# Patient Record
Sex: FEMALE | Race: WHITE | NOT HISPANIC OR LATINO | Employment: STUDENT | URBAN - METROPOLITAN AREA
[De-identification: names, ages, dates, MRNs, and addresses within clinical notes are randomized per-mention and may not be internally consistent; named-entity substitution may affect disease eponyms.]

---

## 2022-09-29 ENCOUNTER — TRANSCRIBE ORDERS (OUTPATIENT)
Dept: URGENT CARE | Facility: CLINIC | Age: 27
End: 2022-09-29

## 2022-09-29 DIAGNOSIS — Z02.1 PHYSICAL EXAM, PRE-EMPLOYMENT: Primary | ICD-10-CM

## 2022-10-03 ENCOUNTER — APPOINTMENT (OUTPATIENT)
Dept: URGENT CARE | Facility: CLINIC | Age: 27
End: 2022-10-03

## 2022-10-03 ENCOUNTER — APPOINTMENT (OUTPATIENT)
Dept: LAB | Facility: CLINIC | Age: 27
End: 2022-10-03

## 2022-10-03 DIAGNOSIS — Z02.1 PHYSICAL EXAM, PRE-EMPLOYMENT: ICD-10-CM

## 2022-10-03 LAB — RUBV IGG SERPL IA-ACNC: 47 IU/ML

## 2022-10-03 PROCEDURE — 86765 RUBEOLA ANTIBODY: CPT

## 2022-10-03 PROCEDURE — 36415 COLL VENOUS BLD VENIPUNCTURE: CPT

## 2022-10-03 PROCEDURE — 86762 RUBELLA ANTIBODY: CPT

## 2022-10-03 PROCEDURE — 86480 TB TEST CELL IMMUN MEASURE: CPT

## 2022-10-03 PROCEDURE — 86787 VARICELLA-ZOSTER ANTIBODY: CPT

## 2022-10-03 PROCEDURE — 86735 MUMPS ANTIBODY: CPT

## 2022-10-04 LAB
MEV IGG SER QL IA: NORMAL
MUV IGG SER QL IA: NORMAL
VZV IGG SER QL IA: NORMAL

## 2022-10-05 LAB
GAMMA INTERFERON BACKGROUND BLD IA-ACNC: 0.04 IU/ML
M TB IFN-G BLD-IMP: NEGATIVE
M TB IFN-G CD4+ BCKGRND COR BLD-ACNC: -0.01 IU/ML
M TB IFN-G CD4+ BCKGRND COR BLD-ACNC: 0 IU/ML
MITOGEN IGNF BCKGRD COR BLD-ACNC: >10 IU/ML

## 2023-01-18 ENCOUNTER — OFFICE VISIT (OUTPATIENT)
Dept: INTERNAL MEDICINE CLINIC | Facility: CLINIC | Age: 28
End: 2023-01-18

## 2023-01-18 VITALS
DIASTOLIC BLOOD PRESSURE: 64 MMHG | HEART RATE: 91 BPM | SYSTOLIC BLOOD PRESSURE: 110 MMHG | HEIGHT: 66 IN | BODY MASS INDEX: 23.88 KG/M2 | TEMPERATURE: 96.9 F | WEIGHT: 148.6 LBS | OXYGEN SATURATION: 98 %

## 2023-01-18 DIAGNOSIS — E80.6 HYPERBILIRUBINEMIA: ICD-10-CM

## 2023-01-18 DIAGNOSIS — R87.619 ABNORMAL CERVICAL PAPANICOLAOU SMEAR, UNSPECIFIED ABNORMAL PAP FINDING: ICD-10-CM

## 2023-01-18 DIAGNOSIS — L40.9 PSORIASIS: Primary | ICD-10-CM

## 2023-01-18 DIAGNOSIS — E78.5 HYPERLIPIDEMIA, UNSPECIFIED HYPERLIPIDEMIA TYPE: ICD-10-CM

## 2023-01-18 DIAGNOSIS — Z13.0 SCREENING FOR DEFICIENCY ANEMIA: ICD-10-CM

## 2023-01-18 DIAGNOSIS — K62.5 RECTAL BLEEDING: ICD-10-CM

## 2023-01-18 DIAGNOSIS — M35.9 UNDIFFERENTIATED CONNECTIVE TISSUE DISEASE (HCC): ICD-10-CM

## 2023-01-18 RX ORDER — MOMETASONE FUROATE 1 MG/G
CREAM TOPICAL DAILY
Qty: 15 G | Refills: 0 | Status: SHIPPED | OUTPATIENT
Start: 2023-01-18

## 2023-01-19 NOTE — PROGRESS NOTES
Name: Deirdre Del Castillo      : 1995      MRN: [id number]  Encounter Provider: Dima Valadez MD  Encounter Date: 2023   Encounter department: [facility]     1  Psoriasis  Assessment & Plan:  Rices type rash of the left hand recommend topical steroid application on a daily basis till resolved    Orders:  -     mometasone (ELOCON) 0 1 % cream; Apply topically daily    2  Rectal bleeding  Assessment & Plan:  Episode of rectal bleeding on 1 day bowel movements with some blood noted in the toilet bowl  No's further episodes  No prior history of rectal bleeding  Recommend FIT testing to detect any ongoing evidence of blood in stool  Orders:  -     Occult Blood, Fecal Immunochemical; Future    3  Screening for deficiency anemia  -     CBC and differential; Future    4  Hyperbilirubinemia  Assessment & Plan:  He is elevation of bilirubin asymptomatic and no sign of tenderness over the liver or gallbladder recommend CMP for follow-up    Orders:  -     Comprehensive metabolic panel; Future    5  Hyperlipidemia, unspecified hyperlipidemia type  Assessment & Plan:  Review of previous blood work indicates mild elevation in LDL  I would like to repeat this study to see if this is a persistent finding we will discuss results with patient pending result completion    Orders:  -     Lipid panel; Future    6  Undifferentiated connective tissue disease ([facility] [address] )  Assessment & Plan:  Previous evaluation of connective tissue disease through [facility] rheumatology Dr Grace December  Patient would like to switch to a [facility] rheumatologist and a referral has been provided  Orders:  -     Ambulatory Referral to Rheumatology; Future    7  Abnormal cervical Papanicolaou smear, unspecified abnormal pap finding  -     Ambulatory Referral to Obstetrics / Gynecology;  Future      Depression Screening and Follow-up Plan: Patient was screened for depression during today's encounter  They screened negative with a PHQ-2 score of 0  Subjective      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 [place]  During today's visit with the patient I reviewed her medical history as well as ongoing medical conditions  The patient has a history of autoimmune disorder  This is been described as a undifferentiated connective tissue disease by her rheumatologist Dr Sarath Del Rio at the [facility]  As she is now employed by [employer] she would like to transfer her rheumatology care to a [facility] rheumatologist and we will be happy to arrange this for the patient  Current symptoms that may be attributed to this condition are a small dry rash on the left hand as well as some occasional left hip irritation  Patient has experienced 3 recent nosebleeds is unusual for her  She also had an episode where there was bowel movements that did show some blood in the water  She has no prior history of any kind of GI bleeding  She has not seen any further evidence of blood since this 1 episode  Patient is noted on previous blood work to have an elevated bilirubin and we have recommended follow-up testing recheck the bilirubin value  The patient is a physical therapist employed by [employer] she indicates that she has no tobacco use on a regular basis and only social use of alcohol  She typically exercises 3 times a week  Family history is significant for lung cancer thyroid conditions hyperlipidemia and hypertension  Review of Systems   Gastrointestinal: Positive for anal bleeding  Musculoskeletal: Positive for arthralgias  Skin: Positive for rash  All other systems reviewed and are negative  No current outpatient medications on file prior to visit         Objective     /64   Pulse 91   Temp (!) 96 9 °F (36 1 °C)   Ht 5' 6" (1 676 m)   Wt 67 4 kg (148 lb 9 6 oz)   SpO2 98%   BMI 23 98 kg/m²     Physical Exam  Vitals reviewed  Constitutional:       General: She is not in acute distress  Appearance: Normal appearance  She is well-developed  She is not ill-appearing  HENT:      Head: Normocephalic  Right Ear: Hearing, tympanic membrane, ear canal and external ear normal       Left Ear: Hearing, tympanic membrane, ear canal and external ear normal       Nose: Nose normal  No mucosal edema  Mouth/Throat:      Mouth: Mucous membranes are moist       Pharynx: Oropharynx is clear  Uvula midline  Eyes:      General: Lids are normal       Extraocular Movements: Extraocular movements intact  Conjunctiva/sclera: Conjunctivae normal       Pupils: Pupils are equal, round, and reactive to light  Neck:      Thyroid: No thyromegaly  Vascular: No carotid bruit or JVD  Cardiovascular:      Rate and Rhythm: Normal rate and regular rhythm  Heart sounds: Normal heart sounds  No murmur heard  Pulmonary:      Effort: Pulmonary effort is normal  No respiratory distress  Breath sounds: Normal breath sounds  No wheezing, rhonchi or rales  Abdominal:      General: Bowel sounds are normal  There is no distension  Palpations: Abdomen is soft  There is no mass  Tenderness: There is no abdominal tenderness  Musculoskeletal:         General: Normal range of motion  Cervical back: Normal range of motion and neck supple  No rigidity or tenderness  Right lower leg: No edema  Left lower leg: No edema  Lymphadenopathy:      Cervical: No cervical adenopathy  Skin:     General: Skin is warm and dry  Coloration: Skin is not jaundiced or pale  Neurological:      General: No focal deficit present  Mental Status: She is alert and oriented to person, place, and time  Deep Tendon Reflexes: Reflexes are normal and symmetric   Reflexes normal    Psychiatric:         Mood and Affect: Mood normal          Speech: Speech normal          Behavior: Behavior normal  Behavior is cooperative  Thought Content:  Thought content normal          Judgment: Judgment normal        Joann Wolf MD

## 2023-01-19 NOTE — ASSESSMENT & PLAN NOTE
Episode of rectal bleeding on 1 day bowel movements with some blood noted in the toilet bowl  No's further episodes  No prior history of rectal bleeding  Recommend FIT testing to detect any ongoing evidence of blood in stool

## 2023-01-19 NOTE — ASSESSMENT & PLAN NOTE
Previous evaluation of connective tissue disease through [facility] rheumatology Dr Diamond Issa  Patient would like to switch to a [facility]'s rheumatologist and a referral has been provided

## 2023-01-19 NOTE — ASSESSMENT & PLAN NOTE
He is elevation of bilirubin asymptomatic and no sign of tenderness over the liver or gallbladder recommend CMP for follow-up

## 2023-01-19 NOTE — ASSESSMENT & PLAN NOTE
Rices type rash of the left hand recommend topical steroid application on a daily basis till resolved

## 2023-01-19 NOTE — ASSESSMENT & PLAN NOTE
Review of previous blood work indicates mild elevation in LDL    I would like to repeat this study to see if this is a persistent finding we will discuss results with patient pending result completion

## 2023-01-21 ENCOUNTER — APPOINTMENT (OUTPATIENT)
Dept: LAB | Age: 28
End: 2023-01-21

## 2023-01-21 DIAGNOSIS — E78.5 HYPERLIPIDEMIA, UNSPECIFIED HYPERLIPIDEMIA TYPE: ICD-10-CM

## 2023-01-21 DIAGNOSIS — E80.6 HYPERBILIRUBINEMIA: ICD-10-CM

## 2023-01-21 DIAGNOSIS — Z13.0 SCREENING FOR DEFICIENCY ANEMIA: ICD-10-CM

## 2023-01-21 LAB
ALBUMIN SERPL BCP-MCNC: 4.1 G/DL (ref 3.5–5)
ALP SERPL-CCNC: 54 U/L (ref 46–116)
ALT SERPL W P-5'-P-CCNC: 15 U/L (ref 12–78)
ANION GAP SERPL CALCULATED.3IONS-SCNC: 3 MMOL/L (ref 4–13)
AST SERPL W P-5'-P-CCNC: 14 U/L (ref 5–45)
BASOPHILS # BLD AUTO: 0.03 THOUSANDS/ÂΜL (ref 0–0.1)
BASOPHILS NFR BLD AUTO: 1 % (ref 0–1)
BILIRUB SERPL-MCNC: 0.99 MG/DL (ref 0.2–1)
BUN SERPL-MCNC: 18 MG/DL (ref 5–25)
CALCIUM SERPL-MCNC: 9.3 MG/DL (ref 8.3–10.1)
CHLORIDE SERPL-SCNC: 105 MMOL/L (ref 96–108)
CHOLEST SERPL-MCNC: 164 MG/DL
CO2 SERPL-SCNC: 28 MMOL/L (ref 21–32)
CREAT SERPL-MCNC: 0.74 MG/DL (ref 0.6–1.3)
EOSINOPHIL # BLD AUTO: 0.03 THOUSAND/ÂΜL (ref 0–0.61)
EOSINOPHIL NFR BLD AUTO: 1 % (ref 0–6)
ERYTHROCYTE [DISTWIDTH] IN BLOOD BY AUTOMATED COUNT: 11.9 % (ref 11.6–15.1)
GFR SERPL CREATININE-BSD FRML MDRD: 110 ML/MIN/1.73SQ M
GLUCOSE P FAST SERPL-MCNC: 82 MG/DL (ref 65–99)
HCT VFR BLD AUTO: 39.1 % (ref 34.8–46.1)
HDLC SERPL-MCNC: 43 MG/DL
HGB BLD-MCNC: 13.1 G/DL (ref 11.5–15.4)
IMM GRANULOCYTES # BLD AUTO: 0.01 THOUSAND/UL (ref 0–0.2)
IMM GRANULOCYTES NFR BLD AUTO: 0 % (ref 0–2)
LDLC SERPL CALC-MCNC: 107 MG/DL (ref 0–100)
LYMPHOCYTES # BLD AUTO: 1.78 THOUSANDS/ÂΜL (ref 0.6–4.47)
LYMPHOCYTES NFR BLD AUTO: 37 % (ref 14–44)
MCH RBC QN AUTO: 29.6 PG (ref 26.8–34.3)
MCHC RBC AUTO-ENTMCNC: 33.5 G/DL (ref 31.4–37.4)
MCV RBC AUTO: 89 FL (ref 82–98)
MONOCYTES # BLD AUTO: 0.41 THOUSAND/ÂΜL (ref 0.17–1.22)
MONOCYTES NFR BLD AUTO: 9 % (ref 4–12)
NEUTROPHILS # BLD AUTO: 2.57 THOUSANDS/ÂΜL (ref 1.85–7.62)
NEUTS SEG NFR BLD AUTO: 52 % (ref 43–75)
NONHDLC SERPL-MCNC: 121 MG/DL
NRBC BLD AUTO-RTO: 0 /100 WBCS
PLATELET # BLD AUTO: 210 THOUSANDS/UL (ref 149–390)
PMV BLD AUTO: 10.4 FL (ref 8.9–12.7)
POTASSIUM SERPL-SCNC: 4.1 MMOL/L (ref 3.5–5.3)
PROT SERPL-MCNC: 8.3 G/DL (ref 6.4–8.4)
RBC # BLD AUTO: 4.42 MILLION/UL (ref 3.81–5.12)
SODIUM SERPL-SCNC: 136 MMOL/L (ref 135–147)
TRIGL SERPL-MCNC: 70 MG/DL
WBC # BLD AUTO: 4.83 THOUSAND/UL (ref 4.31–10.16)

## 2023-01-26 ENCOUNTER — APPOINTMENT (OUTPATIENT)
Dept: LAB | Age: 28
End: 2023-01-26

## 2023-01-26 DIAGNOSIS — K62.5 RECTAL BLEEDING: ICD-10-CM

## 2023-01-26 LAB — HEMOCCULT STL QL IA: NEGATIVE

## 2023-02-15 ENCOUNTER — OFFICE VISIT (OUTPATIENT)
Dept: INTERNAL MEDICINE CLINIC | Facility: CLINIC | Age: 28
End: 2023-02-15

## 2023-02-15 VITALS
BODY MASS INDEX: 24.01 KG/M2 | TEMPERATURE: 97.7 F | SYSTOLIC BLOOD PRESSURE: 110 MMHG | DIASTOLIC BLOOD PRESSURE: 64 MMHG | OXYGEN SATURATION: 98 % | WEIGHT: 149.4 LBS | HEART RATE: 89 BPM | HEIGHT: 66 IN

## 2023-02-15 DIAGNOSIS — L40.9 PSORIASIS: ICD-10-CM

## 2023-02-15 DIAGNOSIS — E78.5 HYPERLIPIDEMIA, UNSPECIFIED HYPERLIPIDEMIA TYPE: ICD-10-CM

## 2023-02-15 DIAGNOSIS — K62.5 RECTAL BLEEDING: ICD-10-CM

## 2023-02-15 DIAGNOSIS — E80.6 HYPERBILIRUBINEMIA: ICD-10-CM

## 2023-02-15 DIAGNOSIS — L03.031 CELLULITIS OF TOE OF RIGHT FOOT: Primary | ICD-10-CM

## 2023-02-15 RX ORDER — CIPROFLOXACIN 250 MG/1
250 TABLET, FILM COATED ORAL EVERY 12 HOURS SCHEDULED
Qty: 14 TABLET | Refills: 0 | Status: SHIPPED | OUTPATIENT
Start: 2023-02-15 | End: 2023-02-22

## 2023-02-15 NOTE — ASSESSMENT & PLAN NOTE
Psoriasis of the skin on the hands seems to be gradually improving recommend continued use of steroid cream

## 2023-02-15 NOTE — ASSESSMENT & PLAN NOTE
No further evidence of active bleeding according to the patient since last visit stool test for blood is negative recommend continued surveillance

## 2023-02-15 NOTE — ASSESSMENT & PLAN NOTE
I have reviewed with the patient her lipid profile cholesterol values show a mild elevation in her LDL reading  Patient education material was provided to the patient to adjust diet in an effort to reduce LDL to a reading under 100

## 2023-02-15 NOTE — ASSESSMENT & PLAN NOTE
Cellulitis of first toe right foot recommend  Soak for 10 to 15 minutes daily and Breo 250 mg twice a day for 7 days

## 2023-02-15 NOTE — ASSESSMENT & PLAN NOTE
Previous reading of hyperbilirubinemia was reviewed with follow-up study for this visit  No evidence of bilirubin elevation on today's blood work    Patient is asymptomatic continue with follow-up blood test in 1 year

## 2023-02-15 NOTE — PROGRESS NOTES
Name: Janelle Myles      : 1995      MRN: [id number]  Encounter Provider: Casey Schmidt MD  Encounter Date: 2/15/2023   Encounter department: [facility]     1  Cellulitis of toe of right foot  Assessment & Plan:  Cellulitis of first toe right foot recommend  Soak for 10 to 15 minutes daily and Breo 250 mg twice a day for 7 days    Orders:  -     ciprofloxacin (CIPRO) 250 mg tablet; Take 1 tablet (250 mg total) by mouth every 12 (twelve) hours for 7 days    2  Hyperlipidemia, unspecified hyperlipidemia type  Assessment & Plan:  I have reviewed with the patient her lipid profile cholesterol values show a mild elevation in her LDL reading  Patient education material was provided to the patient to adjust diet in an effort to reduce LDL to a reading under 100       3  Hyperbilirubinemia  Assessment & Plan:  Previous reading of hyperbilirubinemia was reviewed with follow-up study for this visit  No evidence of bilirubin elevation on today's blood work  Patient is asymptomatic continue with follow-up blood test in 1 year      4  Rectal bleeding  Assessment & Plan:  No further evidence of active bleeding according to the patient since last visit stool test for blood is negative recommend continued surveillance      5  Psoriasis  Assessment & Plan:  Psoriasis of the skin on the hands seems to be gradually improving recommend continued use of steroid cream           Subjective      This 26-year-old female patient returns today for a follow-up visit to review her most recent blood work  Her skin rash has started to respond nicely to the steroid application topically  The patient does have concerns about a discolored toenail on the first toe of her right foot  She also had an episode of tenderness and redness around the perimeter of the nail bed  She denies any recent trauma to the toenail    Looks like she most likely has a mild cellulitis of the tissue around the nail there is some discoloration of the nail which may indicate a early fungal infection underneath the toenail  Ramila May is to treat with antibiotics and topical Betadine soak and reevaluate if symptoms do not resolve  May need topical goal for the discoloration of the nail possible fungal infection    Review of Systems   Skin:        Inflamed skin around the perimeter of the toenail first toe of the right foot some discoloration of the toenail itself and a brown color       Current Outpatient Medications on File Prior to Visit   Medication Sig   • mometasone (ELOCON) 0 1 % cream Apply topically daily       Objective     /64   Pulse 89   Temp 97 7 °F (36 5 °C)   Ht 5' 6" (1 676 m)   Wt 67 8 kg (149 lb 6 4 oz)   SpO2 98%   BMI 24 11 kg/m²     Physical Exam  Vitals reviewed  Constitutional:       General: She is not in acute distress  Appearance: Normal appearance  She is well-developed  She is not ill-appearing  HENT:      Right Ear: Hearing and external ear normal       Left Ear: Hearing and external ear normal       Nose: Nose normal  No mucosal edema  Mouth/Throat:      Pharynx: Uvula midline  Eyes:      General: Lids are normal       Conjunctiva/sclera: Conjunctivae normal       Pupils: Pupils are equal, round, and reactive to light  Neck:      Thyroid: No thyromegaly  Vascular: No carotid bruit or JVD  Cardiovascular:      Rate and Rhythm: Normal rate and regular rhythm  Heart sounds: Normal heart sounds  No murmur heard  Pulmonary:      Effort: Pulmonary effort is normal  No respiratory distress  Breath sounds: Normal breath sounds  Abdominal:      General: Bowel sounds are normal       Palpations: Abdomen is soft  Musculoskeletal:         General: Normal range of motion  Lymphadenopathy:      Cervical: No cervical adenopathy  Skin:     General: Skin is warm and dry        Comments: Erythema and mild tenderness of the skin around the toenail of the first toe right foot some discoloration of the toenail suggestive of possible fungal infection   Neurological:      Mental Status: She is alert and oriented to person, place, and time  Deep Tendon Reflexes: Reflexes are normal and symmetric  Reflexes normal    Psychiatric:         Speech: Speech normal          Behavior: Behavior normal  Behavior is cooperative  Thought Content:  Thought content normal          Judgment: Judgment normal        Buzz Luevano MD

## 2023-02-28 PROBLEM — R87.610 ASCUS WITH POSITIVE HIGH RISK HPV CERVICAL: Status: ACTIVE | Noted: 2023-02-28

## 2023-02-28 PROBLEM — R87.810 ASCUS WITH POSITIVE HIGH RISK HPV CERVICAL: Status: ACTIVE | Noted: 2023-02-28

## 2023-02-28 PROBLEM — Z98.890 HISTORY OF COLPOSCOPY WITH CERVICAL BIOPSY: Status: ACTIVE | Noted: 2023-02-28

## 2023-02-28 NOTE — PROGRESS NOTES
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 other  Normal colposcopy '21  VAIN 1 10/19  This most likely represents colonization with high risk HPV other  She has been vaccinated with Gardasil as a teenager  Recommend follow-up Pap smear in 6 weeks at an annual visit  Currently using condoms for contraception  Pregnancy would be acceptable  Recommend prenatal vitamins  Neural tube defects discussed  Semicid recommended with condoms if she would like increased efficacy  Today's visit took 51 minutes which included reviewing records from both [facility] and [facility]  Pap smear and colposcopy results are listed below under PMH  Diagnoses and all orders for this visit:    ASCUS with positive high risk HPV cervical    History of colposcopy with cervical biopsy    Abnormal cervical Papanicolaou smear, unspecified abnormal pap finding  -     Ambulatory Referral to Obstetrics / Gynecology              Subjective:        Patient ID: Cecelia Campbell is a 28 y o  female  Cecelia Campbell presents today for a follow-up visit due to abnormal Pap smears  She is not due for an annual visit until after April 22  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 [employer]  She is in the same relationship since August 2020  Her [facility] reviews the history of Pap smears and colposcopy since 2019  She has never smoked  The following portions of the patient's history were reviewed and updated as appropriate: She  has no past medical history on file    Patient Active Problem List    Diagnosis Date Noted   • ASCUS with positive high risk HPV cervical 02/28/2023   • History of colposcopy with cervical biopsy 02/28/2023   • Cellulitis of toe of right foot 02/15/2023   • Psoriasis 01/18/2023   • Rectal bleeding 01/18/2023   • Hyperbilirubinemia 01/18/2023   • Undifferentiated connective tissue disease ([facility] [address] ) 01/18/2023   • Hyperlipidemia 01/18/2023 PMH:  Menarche 14  Gardasil  [place]  24 , lifetime partners 5  Never smoked  No STD's other than HPV HR  Abn Pap - Colpo - VAIN 1, 2 Vaginal Bx's and 2 Cervical Bx's, latter said HPV changes not diagnostic of cin1  ASCUS, HR HPV + - normal Colpo '21  ASCUS, HR HPV 4/22   G0  Eczema - hands '22  She  has a past surgical history that includes [place] tooth extraction  Her family history includes Asthma in her brother; Diabetes in her paternal grandmother; Hyperlipidemia in her paternal grandfather; Hypertension in her brother; Kidney disease in her paternal grandmother; Thyroid disease in her mother and paternal grandmother  FH:  M - Hypothyroid  PGM - DM II, kidney disease, thyroid disease  PGF -hyperlipidemia  MGM -smoker, d Lung Ca  MGF -smoker, d lung disease  She  reports that she has never smoked  She has never been exposed to tobacco smoke  She has never used smokeless tobacco  She reports current alcohol use  She reports that she does not use drugs  SH:  Works in [employer] at [employer]  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, [employer]  Current Outpatient Medications   Medication Sig Dispense Refill   • mometasone (ELOCON) 0 1 % cream Apply topically daily 15 g 0     No current facility-administered medications for this visit  Current Outpatient Medications on File Prior to Visit   Medication Sig   • mometasone (ELOCON) 0 1 % cream Apply topically daily     No current facility-administered medications on file prior to visit  She is allergic to sulfamethoxazole-trimethoprim, amoxicillin, and sulfa antibiotics       Review of Systems   Constitutional: Negative for activity change, appetite change, fatigue and unexpected weight change  Eyes: Negative for visual disturbance  Respiratory: Negative for cough, chest tightness, shortness of breath and wheezing  Cardiovascular: Negative for chest pain, palpitations and leg swelling  Breast: Patient denies tenderness, nipple discharge, masses, or erythema  Gastrointestinal: Negative for abdominal distention, abdominal pain, blood in stool, constipation, diarrhea, nausea and vomiting  Endocrine: Negative for cold intolerance and heat intolerance  Genitourinary: Negative for decreased urine volume, difficulty urinating, dyspareunia, dysuria, frequency, hematuria, menstrual problem, pelvic pain, urgency, vaginal bleeding, vaginal discharge and vaginal pain  Musculoskeletal: Positive for arthralgias (Left hip)  Skin: Positive for rash (In the skin between the fingers  Diagnosed as eczema recently  )  Neurological: Negative for weakness, light-headedness, numbness and headaches  Hematological: Does not bruise/bleed easily  Psychiatric/Behavioral: Negative for agitation, behavioral problems and sleep disturbance  The patient is not nervous/anxious  Objective:    Vitals:    03/01/23 1012   BP: 110/70   BP Location: Left arm   Patient Position: Sitting   Cuff Size: Standard   Weight: 66 9 kg (147 lb 6 4 oz)   Height: 5' 5" (1 651 m)            Physical Exam  Vitals and nursing note reviewed  Exam conducted with a chaperone present  Constitutional:       Appearance: Normal appearance  She is normal weight  Eyes:      General: No scleral icterus  Right eye: No discharge  Left eye: No discharge  Extraocular Movements: Extraocular movements intact  Pupils: Pupils are equal, round, and reactive to light  Cardiovascular:      Rate and Rhythm: Normal rate and regular rhythm  Heart sounds: Normal heart sounds  No murmur heard  Pulmonary:      Effort: Pulmonary effort is normal  No respiratory distress  Breath sounds: Normal breath sounds  Abdominal:      General: Abdomen is flat  There is no distension  Palpations: Abdomen is soft  Tenderness: There is no abdominal tenderness  There is no right CVA tenderness, left CVA tenderness, guarding or rebound  Hernia: No hernia is present     Genitourinary: General: Normal vulva  Comments: The vagina and cervix are unremarkable  The cervix is nulliparous  There are no lesions nor induration  Musculoskeletal:      Cervical back: Normal range of motion and neck supple  No rigidity or tenderness  Lymphadenopathy:      Cervical: No cervical adenopathy  Skin:     General: Skin is warm and dry  Comments: Scaling and erythema within the webbing between a few fingers   Neurological:      Mental Status: She is alert  Psychiatric:         Mood and Affect: Mood normal          Behavior: Behavior normal          Thought Content:  Thought content normal          Judgment: Judgment normal

## 2023-03-01 ENCOUNTER — OFFICE VISIT (OUTPATIENT)
Dept: OBGYN CLINIC | Facility: CLINIC | Age: 28
End: 2023-03-01

## 2023-03-01 VITALS
DIASTOLIC BLOOD PRESSURE: 70 MMHG | SYSTOLIC BLOOD PRESSURE: 110 MMHG | WEIGHT: 147.4 LBS | BODY MASS INDEX: 24.56 KG/M2 | HEIGHT: 65 IN

## 2023-03-01 DIAGNOSIS — R87.619 ABNORMAL CERVICAL PAPANICOLAOU SMEAR, UNSPECIFIED ABNORMAL PAP FINDING: ICD-10-CM

## 2023-03-01 DIAGNOSIS — Z98.890 HISTORY OF COLPOSCOPY WITH CERVICAL BIOPSY: ICD-10-CM

## 2023-03-01 DIAGNOSIS — R87.610 ASCUS WITH POSITIVE HIGH RISK HPV CERVICAL: Primary | ICD-10-CM

## 2023-03-01 DIAGNOSIS — R87.810 ASCUS WITH POSITIVE HIGH RISK HPV CERVICAL: Primary | ICD-10-CM

## 2023-03-14 DIAGNOSIS — R21 RASH: Primary | ICD-10-CM

## 2023-04-04 DIAGNOSIS — B35.1 NAIL FUNGUS: Primary | ICD-10-CM

## 2023-04-21 PROBLEM — Z01.419 ENCOUNTER FOR ANNUAL ROUTINE GYNECOLOGICAL EXAMINATION: Status: ACTIVE | Noted: 2023-04-21

## 2023-04-21 PROBLEM — Z87.411 HISTORY OF VAGINAL DYSPLASIA: Status: ACTIVE | Noted: 2023-04-21

## 2023-04-29 PROBLEM — Z98.890 HISTORY OF COLPOSCOPY WITH CERVICAL BIOPSY: Status: RESOLVED | Noted: 2023-02-28 | Resolved: 2023-04-29

## 2023-05-09 PROBLEM — Z98.890 HISTORY OF COLPOSCOPY WITH CERVICAL BIOPSY: Status: ACTIVE | Noted: 2023-05-09

## 2023-05-09 NOTE — PROGRESS NOTES
Assessment/Plan:  NGE    BCM-  Condoms - rec PNV's        Pap smear q 3yrs  - now, '26 if normal  Recurrent ASCUS with persistent HR HPV other  Normal colposcopy '21  VAIN 1 10/19  This most likely represents colonization with high risk HPV other  She has been vaccinated with Gardasil as a teenager  Cervical Cultures - declined                                                                         RTO 1 yr  SBA monthly                                                                              Exercise 3/ wk                                                                                        Calcium 1,000 mg/d with Vit D                    Depression Screen: Neg                                     Diagnoses and all orders for this visit:    Encounter for annual routine gynecological examination  -     Liquid-based pap, screening    ASCUS with positive high risk HPV cervical  -     Liquid-based pap, screening    History of colposcopy with cervical biopsy  -     Liquid-based pap, screening    History of vaginal dysplasia  -     Liquid-based pap, screening    Screening for cervical cancer  -     Liquid-based pap, screening    Other orders  -     clobetasol (TEMOVATE) 0 05 % cream; APPLY TWICE DAILY TO FINGER FOR 2 WEEKS THEN AS NEEDED              Subjective:        Patient ID: Octavia Johnson is a 33 y o  female  [facility]  She has a history of abnormal Pap smears and dysplasia  She has had both FRANNY-1 and Vain 1  She is due for a Pap smear today  Her menses are regular  Her last menstrual period was May 6  She is at the tail end of her menses currently  She and her boyfriend broke up in March  She has not been sexually active since  She declined cervical cultures        The following portions of the patient's history were reviewed and updated as appropriate: She  has no past medical history on file  Patient Active Problem List    Diagnosis Date Noted   • History of colposcopy with cervical biopsy 05/09/2023   • Encounter for annual routine gynecological examination 04/21/2023   • History of vaginal dysplasia 04/21/2023   • ASCUS with positive high risk HPV cervical 02/28/2023   • Cellulitis of toe of right foot 02/15/2023   • Psoriasis 01/18/2023   • Rectal bleeding 01/18/2023   • Hyperbilirubinemia 01/18/2023   • Undifferentiated connective tissue disease ([facility] [address] ) 01/18/2023   • Hyperlipidemia 01/18/2023   PMH:  Menarche 11  Gardasil - teenager  [place]  24 , lifetime partners 5  Never smoked  No STD's other than HPV HR  Abn Pap - Colpo - VAIN 1, 2 Vaginal Bx's and 2 Cervical Bx's, latter said HPV changes not diagnostic of CIN1 10/19  ASCUS, HR HPV + - normal Colpo '21  ASCUS, HR HPV 4/22   G0  Eczema - hands '22  She  has a past surgical history that includes [place] tooth extraction  Her family history includes Asthma in her brother; Diabetes in her paternal grandmother; Hyperlipidemia in her paternal grandfather; Hypertension in her brother; Kidney disease in her paternal grandmother; Thyroid disease in her mother and paternal grandmother  FH:  M - Hypothyroid  PGM - DM II, kidney disease, thyroid disease  PGF -hyperlipidemia  MGM -smoker, d Lung Ca  MGF -smoker, d lung disease  She  reports that she has never smoked  She has never been exposed to tobacco smoke  She has never used smokeless tobacco  She reports current alcohol use  She reports that she does not use drugs  SH:  Works in [employer]  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, Anise Cushing  Patient ship ended 3/23  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  Current Outpatient Medications   Medication Sig Dispense Refill   • clobetasol (TEMOVATE) 0 05 % cream APPLY TWICE DAILY TO FINGER FOR 2 WEEKS THEN AS NEEDED       No current facility-administered medications for this visit       Current Outpatient Medications on "File Prior to Visit   Medication Sig   • clobetasol (TEMOVATE) 0 05 % cream APPLY TWICE DAILY TO FINGER FOR 2 WEEKS THEN AS NEEDED   • [DISCONTINUED] ciclopirox (PENLAC) 8 % solution Apply daily to infected toenail move once a week with alcohol on cotton ball continue use for 3 months or until fungus infection has resolved   • [DISCONTINUED] mometasone (ELOCON) 0 1 % cream Apply topically daily     No current facility-administered medications on file prior to visit  She is allergic to sulfamethoxazole-trimethoprim, amoxicillin, and sulfa antibiotics       Review of Systems   Constitutional: Negative for activity change, appetite change, fatigue and unexpected weight change  Eyes: Negative for visual disturbance  Respiratory: Negative for cough, chest tightness, shortness of breath and wheezing  Cardiovascular: Negative for chest pain, palpitations and leg swelling  Breast: Patient denies tenderness, nipple discharge, masses, or erythema  Gastrointestinal: Negative for abdominal distention, abdominal pain, blood in stool, constipation, diarrhea, nausea and vomiting  Endocrine: Negative for cold intolerance and heat intolerance  Genitourinary: Negative for decreased urine volume, difficulty urinating, dyspareunia, dysuria, frequency, hematuria, menstrual problem, pelvic pain, urgency, vaginal bleeding, vaginal discharge and vaginal pain  Musculoskeletal: Negative for arthralgias  Skin: Negative for rash  Neurological: Negative for weakness, light-headedness, numbness and headaches  Hematological: Does not bruise/bleed easily  Psychiatric/Behavioral: Negative for agitation, behavioral problems and sleep disturbance  The patient is not nervous/anxious            Objective:    Vitals:    05/10/23 0926   BP: 110/80   BP Location: Left arm   Patient Position: Sitting   Cuff Size: Standard   Weight: 67 9 kg (149 lb 12 8 oz)   Height: 5' 6\" (1 676 m)            Physical Exam  Vitals and " nursing note reviewed  Constitutional:       General: She is not in acute distress  Appearance: Normal appearance  She is well-developed and normal weight  HENT:      Head: Normocephalic and atraumatic  Eyes:      General: No scleral icterus  Right eye: No discharge  Left eye: No discharge  Extraocular Movements: Extraocular movements intact  Conjunctiva/sclera: Conjunctivae normal    Neck:      Thyroid: No thyromegaly  Trachea: No tracheal deviation  Cardiovascular:      Rate and Rhythm: Normal rate and regular rhythm  Heart sounds: Normal heart sounds  No murmur heard  Pulmonary:      Effort: Pulmonary effort is normal  No respiratory distress  Breath sounds: Normal breath sounds  No wheezing  Chest:   Breasts:     Breasts are symmetrical       Right: No inverted nipple, mass, nipple discharge, skin change or tenderness  Left: No inverted nipple, mass, nipple discharge, skin change or tenderness  Abdominal:      General: Bowel sounds are normal  There is no distension  Palpations: Abdomen is soft  There is no mass  Tenderness: There is no abdominal tenderness  There is no guarding or rebound  Genitourinary:     General: Normal vulva  Labia:         Right: No rash, tenderness or lesion  Left: No rash, tenderness or lesion  Vagina: Normal       Cervix: No cervical motion tenderness or discharge  Uterus: Not deviated, not enlarged and not tender  Adnexa:         Right: No mass, tenderness or fullness  Left: No mass, tenderness or fullness  Rectum: No external hemorrhoid  Comments: Urethral meatus within normal limits  Perineum within normal limits  Bladder well supported  Vulva and vagina within normal limits  Musculoskeletal:         General: No tenderness  Normal range of motion  Cervical back: Normal range of motion and neck supple     Lymphadenopathy:      Cervical: No cervical adenopathy  Skin:     General: Skin is warm and dry  Neurological:      Mental Status: She is alert and oriented to person, place, and time  Psychiatric:         Mood and Affect: Mood normal          Behavior: Behavior normal          Thought Content:  Thought content normal          Judgment: Judgment normal

## 2023-05-10 ENCOUNTER — ANNUAL EXAM (OUTPATIENT)
Dept: OBGYN CLINIC | Facility: CLINIC | Age: 28
End: 2023-05-10

## 2023-05-10 VITALS
SYSTOLIC BLOOD PRESSURE: 110 MMHG | DIASTOLIC BLOOD PRESSURE: 80 MMHG | HEIGHT: 66 IN | WEIGHT: 149.8 LBS | BODY MASS INDEX: 24.08 KG/M2

## 2023-05-10 DIAGNOSIS — R87.810 ASCUS WITH POSITIVE HIGH RISK HPV CERVICAL: ICD-10-CM

## 2023-05-10 DIAGNOSIS — Z12.4 SCREENING FOR CERVICAL CANCER: ICD-10-CM

## 2023-05-10 DIAGNOSIS — R87.610 ASCUS WITH POSITIVE HIGH RISK HPV CERVICAL: ICD-10-CM

## 2023-05-10 DIAGNOSIS — Z87.411 HISTORY OF VAGINAL DYSPLASIA: ICD-10-CM

## 2023-05-10 DIAGNOSIS — Z98.890 HISTORY OF COLPOSCOPY WITH CERVICAL BIOPSY: ICD-10-CM

## 2023-05-10 DIAGNOSIS — Z01.419 ENCOUNTER FOR ANNUAL ROUTINE GYNECOLOGICAL EXAMINATION: Primary | ICD-10-CM

## 2023-05-10 RX ORDER — CLOBETASOL PROPIONATE 0.5 MG/G
CREAM TOPICAL
COMMUNITY
Start: 2023-04-11

## 2023-05-18 LAB
LAB AP GYN PRIMARY INTERPRETATION: NORMAL
Lab: NORMAL

## 2023-07-08 PROBLEM — Z98.890 HISTORY OF COLPOSCOPY WITH CERVICAL BIOPSY: Status: RESOLVED | Noted: 2023-05-09 | Resolved: 2023-07-08

## 2023-08-23 DIAGNOSIS — L03.039 CELLULITIS OF TOE, UNSPECIFIED LATERALITY: Primary | ICD-10-CM

## 2023-08-23 RX ORDER — CIPROFLOXACIN 250 MG/1
250 TABLET, FILM COATED ORAL EVERY 12 HOURS SCHEDULED
Qty: 14 TABLET | Refills: 0 | Status: SHIPPED | OUTPATIENT
Start: 2023-08-23 | End: 2023-08-30

## 2023-09-15 ENCOUNTER — APPOINTMENT (OUTPATIENT)
Dept: LAB | Age: 28
End: 2023-09-15
Payer: COMMERCIAL

## 2023-09-15 DIAGNOSIS — Z00.8 HEALTH EXAMINATION IN POPULATION SURVEY: Primary | ICD-10-CM

## 2023-09-15 LAB
CHOLEST SERPL-MCNC: 156 MG/DL
EST. AVERAGE GLUCOSE BLD GHB EST-MCNC: 94 MG/DL
HBA1C MFR BLD: 4.9 %
HDLC SERPL-MCNC: 43 MG/DL
LDLC SERPL CALC-MCNC: 81 MG/DL (ref 0–100)
NONHDLC SERPL-MCNC: 113 MG/DL
TRIGL SERPL-MCNC: 160 MG/DL

## 2023-09-15 PROCEDURE — 83036 HEMOGLOBIN GLYCOSYLATED A1C: CPT

## 2023-09-15 PROCEDURE — 36415 COLL VENOUS BLD VENIPUNCTURE: CPT

## 2023-09-15 PROCEDURE — 80061 LIPID PANEL: CPT

## 2024-02-21 PROBLEM — Z01.419 ENCOUNTER FOR ANNUAL ROUTINE GYNECOLOGICAL EXAMINATION: Status: RESOLVED | Noted: 2023-04-21 | Resolved: 2024-02-21

## 2024-06-27 NOTE — PROGRESS NOTES
Assessment        Diagnoses and all orders for this visit:    Encntr for gyn exam (general) (routine) w/o abn findings    Cervical cancer screening  -     Liquid-based pap, screening    Screen for STD (sexually transmitted disease)  -     Chlamydia/GC amplified DNA by PCR  -     Hepatitis C antibody; Future  -     HIV 1/2 AG/AB w Reflex [facility] for 2 yr old and above; Future             Plan      All questions answered.  Await pap smear results.  Chlamydia specimen.  Contraception: condoms.  Discussed healthy lifestyle modifications.  Educational material distributed.  Follow up in 1 year.  Follow up as needed.  GC specimen.  Pap smear.       Gunnar Nettles is a 29 y.o. female who presents for annual exam.      Chief Complaint   Patient presents with    Gynecologic Exam     H/o abnormal PAPs,  VAIN 1 in 2019, FRANNY 1    She is sexually active, same partner x 1 year    5/10/23 NILM no HPV test    PAP 22  ASCUS HPV other   Colpo 6/3/22 benign    PAP 3/23/21 ASCUS HPV other   Colpo 21 ECC negative    PAP 10/2019 LSIL  Colpo 10/21/2019 FRANNY 1, VAIN 1     pap NILM  2018 pap NILM      Last STD testing 2 years ago    Last Pap: 5/10/23    HPV vaccine completed:yes   Current contraception: condoms  History of abnormal Pap smear: yes - ASCUS HPV  History of abnormal mammogram: no  Family history of uterine or ovarian cancer: no  Family history of breast cancer: no  Family history of colon cancer: no    OB History    Para Term  AB Living   0 0 0 0 0 0   SAB IAB Ectopic Multiple Live Births   0 0 0 0 0   Obstetric Comments   Menarche: 11       Menstrual History:  OB History          0    Para   0    Term   0       0    AB   0    Living   0         SAB   0    IAB   0    Ectopic   0    Multiple   0    Live Births   0           Obstetric Comments   Menarche: 11             Patient's last menstrual period was 2024 (exact date).             No past medical history on  "file.  Past Surgical History:   Procedure Laterality Date    WISDOM TOOTH EXTRACTION       Family History   Problem Relation Age of Onset    Thyroid disease Mother     Hypertension Brother     Asthma Brother     Kidney disease Paternal Grandmother     Thyroid disease Paternal Grandmother     Diabetes Paternal Grandmother     Hyperlipidemia Paternal Grandfather        Social History     Tobacco Use    Smoking status: Never     Passive exposure: Never    Smokeless tobacco: Never   Vaping Use    Vaping status: Never Used   Substance Use Topics    Alcohol use: Yes     Comment: social    Drug use: Never          Current Outpatient Medications:     clobetasol (TEMOVATE) 0.05 % cream, APPLY TWICE DAILY TO FINGER FOR 2 WEEKS THEN AS NEEDED, Disp: , Rfl:     Allergies   Allergen Reactions    Sulfamethoxazole-Trimethoprim Other (See Comments)    Amoxicillin Rash    Sulfa Antibiotics Rash           Review of Systems   Constitutional: Negative.    HENT: Negative.     Eyes: Negative.    Respiratory: Negative.     Cardiovascular: Negative.    Gastrointestinal: Negative.    Endocrine: Negative.    Genitourinary:         As noted in HPI   Musculoskeletal: Negative.    Skin: Negative.    Allergic/Immunologic: Negative.    Neurological: Negative.    Hematological: Negative.    Psychiatric/Behavioral: Negative.         /68   Pulse 66   Ht 5' 7\" (1.702 m)   Wt 68.4 kg (150 lb 12.8 oz)   LMP 06/22/2024 (Exact Date)   SpO2 98%   BMI 23.62 kg/m²         Physical Exam  Constitutional:       Appearance: She is well-developed.   Genitourinary:      Vulva, bladder and rectum normal.      No lesions in the vagina.      Right Labia: No rash, tenderness, lesions, skin changes or Bartholin's cyst.     Left Labia: No tenderness, lesions, skin changes, Bartholin's cyst or rash.     No inguinal adenopathy present in the right or left side.     No vaginal discharge, tenderness or bleeding.      No vaginal prolapse present.     No vaginal " atrophy present.       Right Adnexa: not tender, not full and no mass present.     Left Adnexa: not tender, not full and no mass present.     No cervical motion tenderness, friability, lesion or polyp.      Uterus is not enlarged or tender.      Pelvic exam was performed with patient in the lithotomy position.   Rectum:      No external hemorrhoid.   Breasts:     Right: No mass, nipple discharge, skin change or tenderness.      Left: No mass, nipple discharge, skin change or tenderness.   HENT:      Head: Normocephalic.      Nose: Nose normal.   Eyes:      Conjunctiva/sclera: Conjunctivae normal.   Neck:      Thyroid: No thyromegaly.   Cardiovascular:      Rate and Rhythm: Normal rate.   Pulmonary:      Effort: Pulmonary effort is normal.   Abdominal:      General: There is no distension.      Palpations: Abdomen is soft. There is no mass.      Tenderness: There is no abdominal tenderness. There is no guarding or rebound.   Musculoskeletal:         General: No tenderness.      Cervical back: Neck supple. No muscular tenderness.   Lymphadenopathy:      Cervical: No cervical adenopathy.      Lower Body: No right inguinal adenopathy. No left inguinal adenopathy.   Neurological:      Mental Status: She is alert and oriented to person, place, and time.   Skin:     General: Skin is warm and dry.   Psychiatric:         Mood and Affect: Mood normal.         Behavior: Behavior normal.             Future Appointments   Date Time Provider Department Center   7/17/2024  2:00 PM MD DIPAK Brown PCP [place]

## 2024-06-28 ENCOUNTER — ANNUAL EXAM (OUTPATIENT)
Dept: OBGYN CLINIC | Facility: CLINIC | Age: 29
End: 2024-06-28
Payer: COMMERCIAL

## 2024-06-28 VITALS
HEART RATE: 66 BPM | SYSTOLIC BLOOD PRESSURE: 110 MMHG | BODY MASS INDEX: 23.67 KG/M2 | OXYGEN SATURATION: 98 % | HEIGHT: 67 IN | DIASTOLIC BLOOD PRESSURE: 68 MMHG | WEIGHT: 150.8 LBS

## 2024-06-28 DIAGNOSIS — Z11.3 SCREEN FOR STD (SEXUALLY TRANSMITTED DISEASE): ICD-10-CM

## 2024-06-28 DIAGNOSIS — Z12.4 CERVICAL CANCER SCREENING: ICD-10-CM

## 2024-06-28 DIAGNOSIS — Z01.419 ENCNTR FOR GYN EXAM (GENERAL) (ROUTINE) W/O ABN FINDINGS: Primary | ICD-10-CM

## 2024-06-28 PROCEDURE — G0145 SCR C/V CYTO,THINLAYER,RESCR: HCPCS | Performed by: STUDENT IN AN ORGANIZED HEALTH CARE EDUCATION/TRAINING PROGRAM

## 2024-06-28 PROCEDURE — G0476 HPV COMBO ASSAY CA SCREEN: HCPCS | Performed by: OBSTETRICS & GYNECOLOGY

## 2024-06-28 PROCEDURE — 87591 N.GONORRHOEAE DNA AMP PROB: CPT | Performed by: OBSTETRICS & GYNECOLOGY

## 2024-06-28 PROCEDURE — S0612 ANNUAL GYNECOLOGICAL EXAMINA: HCPCS | Performed by: OBSTETRICS & GYNECOLOGY

## 2024-06-28 PROCEDURE — G0124 SCREEN C/V THIN LAYER BY MD: HCPCS | Performed by: STUDENT IN AN ORGANIZED HEALTH CARE EDUCATION/TRAINING PROGRAM

## 2024-06-28 PROCEDURE — 87491 CHLMYD TRACH DNA AMP PROBE: CPT | Performed by: OBSTETRICS & GYNECOLOGY

## 2024-07-01 LAB
HPV HR 12 DNA CVX QL NAA+PROBE: POSITIVE
HPV16 DNA CVX QL NAA+PROBE: NEGATIVE
HPV18 DNA CVX QL NAA+PROBE: NEGATIVE

## 2024-07-02 LAB
C TRACH DNA SPEC QL NAA+PROBE: NEGATIVE
N GONORRHOEA DNA SPEC QL NAA+PROBE: NEGATIVE

## 2024-07-08 LAB
LAB AP GYN PRIMARY INTERPRETATION: ABNORMAL
Lab: ABNORMAL
PATH INTERP SPEC-IMP: ABNORMAL

## 2024-07-10 ENCOUNTER — TELEPHONE (OUTPATIENT)
Dept: OBGYN CLINIC | Facility: CLINIC | Age: 29
End: 2024-07-10

## 2024-07-10 ENCOUNTER — TELEPHONE (OUTPATIENT)
Age: 29
End: 2024-07-10

## 2024-07-10 NOTE — TELEPHONE ENCOUNTER
Patient was called and left a non-detailed voice message regarding pap results. Advised patient to call the office to discuss results.

## 2024-07-10 NOTE — TELEPHONE ENCOUNTER
----- Message from Alisha Chilel MD sent at 7/8/2024  4:29 PM EDT -----  Please call patient regarding abnormal Pap test.  She will need to schedule a colposcopy.

## 2024-07-10 NOTE — TELEPHONE ENCOUNTER
Patient returned call. Pap results provided per Dr. Chilel. Colposcopy scheduled. Reviewed Colposcopy procedure with patient.

## 2024-08-02 ENCOUNTER — APPOINTMENT (OUTPATIENT)
Dept: LAB | Facility: CLINIC | Age: 29
End: 2024-08-02
Payer: COMMERCIAL

## 2024-08-02 ENCOUNTER — OFFICE VISIT (OUTPATIENT)
Dept: FAMILY MEDICINE CLINIC | Facility: CLINIC | Age: 29
End: 2024-08-02
Payer: COMMERCIAL

## 2024-08-02 VITALS
DIASTOLIC BLOOD PRESSURE: 60 MMHG | HEIGHT: 68 IN | BODY MASS INDEX: 23.19 KG/M2 | TEMPERATURE: 97.7 F | SYSTOLIC BLOOD PRESSURE: 100 MMHG | WEIGHT: 153 LBS | OXYGEN SATURATION: 98 % | HEART RATE: 90 BPM

## 2024-08-02 DIAGNOSIS — Z00.8 HEALTH EXAMINATION IN POPULATION SURVEYS: ICD-10-CM

## 2024-08-02 DIAGNOSIS — Z11.3 SCREEN FOR STD (SEXUALLY TRANSMITTED DISEASE): ICD-10-CM

## 2024-08-02 DIAGNOSIS — Z00.00 ANNUAL PHYSICAL EXAM: Primary | ICD-10-CM

## 2024-08-02 PROBLEM — L03.031 CELLULITIS OF TOE OF RIGHT FOOT: Status: RESOLVED | Noted: 2023-02-15 | Resolved: 2024-08-02

## 2024-08-02 LAB
CHOLEST SERPL-MCNC: 183 MG/DL
EST. AVERAGE GLUCOSE BLD GHB EST-MCNC: 97 MG/DL
HBA1C MFR BLD: 5 %
HCV AB SER QL: NORMAL
HDLC SERPL-MCNC: 40 MG/DL
HIV 1+2 AB+HIV1 P24 AG SERPL QL IA: NORMAL
HIV 2 AB SERPL QL IA: NORMAL
HIV1 AB SERPL QL IA: NORMAL
HIV1 P24 AG SERPL QL IA: NORMAL
LDLC SERPL CALC-MCNC: 114 MG/DL (ref 0–100)
NONHDLC SERPL-MCNC: 143 MG/DL
TRIGL SERPL-MCNC: 144 MG/DL

## 2024-08-02 PROCEDURE — 36415 COLL VENOUS BLD VENIPUNCTURE: CPT

## 2024-08-02 PROCEDURE — 87389 HIV-1 AG W/HIV-1&-2 AB AG IA: CPT

## 2024-08-02 PROCEDURE — 99395 PREV VISIT EST AGE 18-39: CPT | Performed by: FAMILY MEDICINE

## 2024-08-02 PROCEDURE — 80061 LIPID PANEL: CPT

## 2024-08-02 PROCEDURE — 86803 HEPATITIS C AB TEST: CPT

## 2024-08-02 PROCEDURE — 83036 HEMOGLOBIN GLYCOSYLATED A1C: CPT

## 2024-08-02 NOTE — PATIENT INSTRUCTIONS
Elvis Brown MD     normal (ped)... In no apparent distress, appears well developed and well nourished.

## 2024-08-02 NOTE — PROGRESS NOTES
Adult Annual Physical  Name: Patsy Nettles      : 1995      MRN: [id number]  Encounter Provider: Summer Mir MD  Encounter Date: 2024   Encounter department: [facility]    Assessment & Plan   1. Annual physical exam    - Satisfactory physical examination   - Healthcare maintenance reviewed; patient up to date with cervical cancer screening   - Follow up in one year for annual physical or as needed    History of Present Illness   Patsy Nettles is a very pleasant 29 year old female who presents today for an annual physical and to establish care. Overall she feels well and endorses no complaints at this time. 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 She does have a wart underneath one of her toenails that keeps recurring and is looking to establish care with a dermatologist.   Adult Annual Physical:  Patient presents for annual physical.     Diet and Physical Activity:  - Diet/Nutrition: well balanced diet.  - Exercise:. 3 times a week on average    Depression Screening:  - PHQ-2 Score: 0    General Health:  - Sleep: sleeps well.  - Vision: goes for regular eye exams and wears glasses.  - Dental: regular dental visits.    /GYN Health:  - Follows with GYN: yes.   - Menopause: premenopausal.     Review of Systems   Constitutional: Negative.    HENT: Negative.     Eyes: Negative.    Respiratory: Negative.     Cardiovascular: Negative.    Gastrointestinal: Negative.    Genitourinary: Negative.    Musculoskeletal: Negative.    Skin: Negative.    Neurological: Negative.    Psychiatric/Behavioral: Negative.       Current Outpatient Medications on File Prior to Visit   Medication Sig Dispense Refill    clobetasol (TEMOVATE) 0.05 % cream APPLY TWICE DAILY TO FINGER FOR 2 WEEKS THEN AS NEEDED       No current facility-administered medications on file prior to visit.      Social History     Tobacco Use    Smoking status: Never     Passive exposure: Never    Smokeless  "tobacco: Never   Vaping Use    Vaping status: Never Used   Substance and Sexual Activity    Alcohol use: Yes     Comment: social    Drug use: Never    Sexual activity: Yes     Partners: Male     Birth control/protection: Condom Male     Family History   Problem Relation Age of Onset    Thyroid disease Mother     Hypertension Brother     Asthma Brother     Kidney disease Paternal Grandmother     Thyroid disease Paternal Grandmother     Diabetes Paternal Grandmother     Hyperlipidemia Paternal Grandfather       Objective     /60 (BP Location: Left arm, Patient Position: Sitting, Cuff Size: Adult)   Pulse 90   Temp 97.7 °F (36.5 °C) (Temporal)   Ht 5' 7.5\" (1.715 m)   Wt 69.4 kg (153 lb)   LMP 07/25/2024   SpO2 98%   BMI 23.61 kg/m²     Physical Exam  Constitutional:       General: She is not in acute distress.     Appearance: She is not ill-appearing.   HENT:      Head: Normocephalic and atraumatic.      Mouth/Throat:      Mouth: Mucous membranes are moist.      Pharynx: No oropharyngeal exudate or posterior oropharyngeal erythema.   Eyes:      General:         Right eye: No discharge.         Left eye: No discharge.      Extraocular Movements: Extraocular movements intact.      Pupils: Pupils are equal, round, and reactive to light.   Cardiovascular:      Rate and Rhythm: Normal rate.      Pulses: Normal pulses.   Pulmonary:      Effort: Pulmonary effort is normal. No respiratory distress.      Breath sounds: No wheezing.   Abdominal:      General: Bowel sounds are normal. There is no distension.      Palpations: Abdomen is soft.      Tenderness: There is no abdominal tenderness. There is no guarding.   Musculoskeletal:      Right lower leg: No edema.      Left lower leg: No edema.   Neurological:      General: No focal deficit present.      Mental Status: She is alert.      Motor: No weakness.      Coordination: Coordination normal.      Gait: Gait normal.      Deep Tendon Reflexes: Reflexes normal. "   Psychiatric:         Mood and Affect: Mood normal.         Behavior: Behavior normal.

## 2024-08-09 ENCOUNTER — PROCEDURE VISIT (OUTPATIENT)
Dept: OBGYN CLINIC | Facility: CLINIC | Age: 29
End: 2024-08-09
Payer: COMMERCIAL

## 2024-08-09 VITALS
HEIGHT: 68 IN | BODY MASS INDEX: 23.31 KG/M2 | SYSTOLIC BLOOD PRESSURE: 116 MMHG | DIASTOLIC BLOOD PRESSURE: 80 MMHG | WEIGHT: 153.8 LBS

## 2024-08-09 DIAGNOSIS — Z32.02 NEGATIVE PREGNANCY TEST: ICD-10-CM

## 2024-08-09 DIAGNOSIS — R87.610 ATYPICAL SQUAMOUS CELLS OF UNDETERMINED SIGNIFICANCE (ASCUS) ON PAPANICOLAOU SMEAR OF CERVIX: Primary | ICD-10-CM

## 2024-08-09 LAB — SL AMB POCT URINE HCG: NEGATIVE

## 2024-08-09 PROCEDURE — 88305 TISSUE EXAM BY PATHOLOGIST: CPT | Performed by: PATHOLOGY

## 2024-08-09 PROCEDURE — 81025 URINE PREGNANCY TEST: CPT | Performed by: PHYSICIAN ASSISTANT

## 2024-08-09 PROCEDURE — 57454 BX/CURETT OF CERVIX W/SCOPE: CPT | Performed by: PHYSICIAN ASSISTANT

## 2024-08-09 NOTE — PATIENT INSTRUCTIONS
POST COLPOSCOPY INSTRUCTIONS  Light spotting and mild cramping are normal up to 48 hrs after procedure.   Please call us if you have heavy vaginal bleeding, severe pelvic pain/cramping or for fever > 101  DO NOT insert anything in the vagina for the next 7 days. (no sex, tampons, douche etc)  Use ibuprofen/ NSAIDS (or other over the counter pain relievers) as needed for discomfort.  Results should be available in 5-7 days and we will contact you by phone with results.

## 2024-08-09 NOTE — PROGRESS NOTES
"Patient is here today for Colposcopy.    24 Pap smear: ASCUS, HPV HR +,  HPV 16/18 negative    Patient with hx of abnormal paps:  5/10/23 NILM no HPV test     PAP 22  ASCUS HPV other   Colpo 6/3/22 benign     PAP 3/23/21 ASCUS HPV other   Colpo 21 ECC negative     PAP 10/2019 LSIL  Colpo 10/21/2019 FRANNY 1, VAIN 1      pap NILM   pap NILM    29 y.o.      Patient's last menstrual period was 2024.   Contraception: condoms  Patient has  completed gardisil vaccine.  Smoker: no    Current Outpatient Medications on File Prior to Visit   Medication Sig Dispense Refill    clobetasol (TEMOVATE) 0.05 % cream APPLY TWICE DAILY TO FINGER FOR 2 WEEKS THEN AS NEEDED       No current facility-administered medications on file prior to visit.       Allergies   Allergen Reactions    Sulfamethoxazole-Trimethoprim Other (See Comments)    Amoxicillin Rash    Penicillins Rash    Sulfa Antibiotics Rash        Vitals:    24 1118   BP: 116/80         Description of procedure:  Speculum was placed in the vagina.  Cervix was well visualized and coated with acetic acid solution.  Entire squamocolumnar junction was visualized.  The entire cervix was carefully surveyed with the colposcope.    Cervix fully visible: yes  SCJ fully visualized:  yes  Acetowhitening visualized:  yes @ 11 and 5  Vascular changes noted:   no    Findings:    Biopsies taken from :  11 and 5 oclock  Colposcopy adequate:   yes    Monsel's solution applied to cervix and hemostasis achieved.  Patient tolerated procedure well.       Colposcopy     Date/Time  2024 11:30 AM     Universal Protocol   Consent: Verbal consent obtained. Written consent obtained.  Risks and benefits: risks, benefits and alternatives were discussed  Consent given by: patient  Time out: Immediately prior to procedure a \"time out\" was called to verify the correct patient, procedure, equipment, support staff and site/side marked as required.  Patient " understanding: patient states understanding of the procedure being performed  Patient consent: the patient's understanding of the procedure matches consent given  Procedure consent: procedure consent matches procedure scheduled  Patient identity confirmed: verbally with patient     Performed by  Micky Stevens PA-C   Authorized by  Micky Stevens PA-C     Pre-procedure details      Pre-procedure timeout performed: yes      Premeds:  Ibuprofen    Prepped with: acetic acid     Indication    ASC-US   Procedure Details   Procedure: Colposcopy w/ cervical biopsy and ECC      Under satisfactory analgesia the patient was prepped and draped in the dorsal lithotomy position: yes      Milton speculum was placed in the vagina: yes      Under colposcopic examination the transition zone was seen in entirety: yes      Endocervix was curetted using a Kevorkian curette: yes      Cervical biopsy performed with a cervical biopsy punch: yes      Monsel's solution was applied: yes      Biopsy(s): yes      Location:  11 and 5    Specimen to pathology: yes     Post-procedure      Findings: White epithelium      Patient tolerance of procedure:  Tolerated well, no immediate complications           Impression: mild dysplasia at most    Colposcopy done with biopsies ECC.  Monsel's solution used for hemostasis. Specimen sent for pathology. Nothing in vagina for 7 days.  Call with fever, pain, bleeding, or foul smelling discharge.

## 2024-08-12 PROCEDURE — 88305 TISSUE EXAM BY PATHOLOGIST: CPT | Performed by: PATHOLOGY

## 2024-08-13 ENCOUNTER — TELEPHONE (OUTPATIENT)
Dept: OBGYN CLINIC | Facility: CLINIC | Age: 29
End: 2024-08-13

## 2024-08-13 PROBLEM — R87.619 ABNORMAL PAP SMEAR OF CERVIX: Status: ACTIVE | Noted: 2024-08-13

## 2024-08-13 NOTE — TELEPHONE ENCOUNTER
Left message on machine for pt to call office re: colpo results      Final Diagnosis   A. Cervix, Endocervical, 5 o'clock:  Low grade squamous intraepithelial lesion (FRANNY-1)      B. Cervix, Endocervical, 11 o'clock:  Low grade squamous intraepithelial lesion (FRANNY-1)       C. Endocervical, ECC:  Minute fragments of ectocervical mucosa with focal koilocytic atypia and, fragments of endocervical mucosa with squamous metaplasia intermixed with abundant mucus and blood         My chart message sent

## 2024-08-16 ENCOUNTER — NURSE TRIAGE (OUTPATIENT)
Age: 29
End: 2024-08-16

## 2024-08-16 DIAGNOSIS — B96.89 BV (BACTERIAL VAGINOSIS): Primary | ICD-10-CM

## 2024-08-16 DIAGNOSIS — N76.0 BV (BACTERIAL VAGINOSIS): Primary | ICD-10-CM

## 2024-08-16 RX ORDER — METRONIDAZOLE 500 MG/1
500 TABLET ORAL 2 TIMES DAILY
Qty: 14 TABLET | Refills: 0 | Status: SHIPPED | OUTPATIENT
Start: 2024-08-16 | End: 2024-08-23

## 2024-08-16 NOTE — TELEPHONE ENCOUNTER
"Patient wrote in with concern for vulvovaginal burning and fishy odor. Called patient to review symptoms further. Denies abnormal discharge. Notes symptoms started about 2 days ago. Reviewed like BV, discharge antibiotic and patient requests PO. Medication sent to pharmacy on file.     If patient has history of BV in prior two years, prescribe:    -Metrogel Intravaginally x 5 nights, with no refills.    If patient refuses the intravaginal medication and is requesting a PO medication, prescribe:    -Flagyl 500mg BID PO x 7 days, with no refills    Medication instructions:  Please instruct patient that they cannot drink alcohol while on Flagyl.    Please instruct patient that they should not have sex while on treatment or 3 days after if using Metrogel.    If patient is having recurrent BV infections, please schedule appointment (should be seen within 5 days).     Reason for Disposition  • All other vaginal symptoms (Exception: feels like prior yeast infection, minor abrasion, mild rash < 24 hour duration, mild itching)    Answer Assessment - Initial Assessment Questions  1. SYMPTOM: \"What's the main symptom you're concerned about?\" (e.g., pain, itching, dryness)      Vulvovaginal burning and fishy odor  2. LOCATION: \"Where is the  s/s located?\" (e.g., inside/outside, left/right)      Burning external  3. ONSET: \"When did the  s/s  start?\"      2 days ago  4. PAIN: \"Is there any pain?\" If Yes, ask: \"How bad is it?\" (Scale: 1-10; mild, moderate, severe)      Just burning  5. ITCHING: \"Is there any itching?\" If Yes, ask: \"How bad is it?\" (Scale: 1-10; mild, moderate, severe)      Denies  6. CAUSE: \"What do you think is causing the discharge?\" \"Have you had the same problem before? What happened then?\"      Possible BV  7. OTHER SYMPTOMS: \"Do you have any other symptoms?\" (e.g., fever, itching, vaginal bleeding, pain with urination, injury to genital area, vaginal foreign body)      Denies  8. PREGNANCY: \"Is there any " "chance you are pregnant?\" \"When was your last menstrual period?\"      Denies    Protocols used: Vaginal Symptoms-ADULT-[place]    "

## 2025-04-02 ENCOUNTER — OFFICE VISIT (OUTPATIENT)
Dept: FAMILY MEDICINE CLINIC | Facility: CLINIC | Age: 30
End: 2025-04-02
Payer: COMMERCIAL

## 2025-04-02 VITALS
DIASTOLIC BLOOD PRESSURE: 60 MMHG | BODY MASS INDEX: 25.46 KG/M2 | HEIGHT: 68 IN | HEART RATE: 99 BPM | SYSTOLIC BLOOD PRESSURE: 100 MMHG | WEIGHT: 168 LBS | OXYGEN SATURATION: 100 % | TEMPERATURE: 97.2 F

## 2025-04-02 DIAGNOSIS — R53.83 OTHER FATIGUE: Primary | ICD-10-CM

## 2025-04-02 PROCEDURE — 99214 OFFICE O/P EST MOD 30 MIN: CPT | Performed by: FAMILY MEDICINE

## 2025-04-02 NOTE — PROGRESS NOTES
"Name: Patsy Nettles      : 1995      MRN: [id number]  Encounter Provider: Summer Mir MD  Encounter Date: 2025   Encounter department: [facility]  :  Assessment & Plan  Other fatigue  No major abnormalities on physical exam.  At this time we will order lab work to rule out anemia, systemic disease, thyroid dysfunction, vitamin deficiency.  Orders:    CBC and differential; Future    Comprehensive metabolic panel; Future    TSH, 3rd generation with Free T4 reflex; Future    Iron Panel (Includes Ferritin, Iron Sat%, Iron, and TIBC); Future    Vitamin D 25 hydroxy; Future    Vitamin B12; Future    C-reactive protein; Future           History of Present Illness   HPI  Patsy Nettles is a very pleasant 29-year-old female who presents today with a chief complaint of fatigue which has been going on for the last 2 to 3 months.  She states that she generally gets a good amount of sleep but finds herself wanting to nap more during the day.  She denies any other associated symptoms such as night sweats or unintentional weight loss.  She also denies any recent illness.  She works as a physical therapist and changed job locations to be closer to home and is also in the midst of planning her wedding but denies any feelings of depression.    Review of Systems   Constitutional:  Positive for fatigue.   HENT: Negative.     Eyes: Negative.    Respiratory: Negative.     Cardiovascular: Negative.    Gastrointestinal: Negative.    Genitourinary: Negative.    Musculoskeletal: Negative.    Skin: Negative.    Neurological: Negative.    Psychiatric/Behavioral: Negative.         Objective   /60 (BP Location: Left arm, Patient Position: Sitting, Cuff Size: Adult)   Pulse 99   Temp (!) 97.2 °F (36.2 °C) (Temporal)   Ht 5' 7.5\" (1.715 m)   Wt 76.2 kg (168 lb)   LMP 2025   SpO2 100%   BMI 25.92 kg/m²      Physical Exam  Constitutional:       General: She is not in acute distress.     " Appearance: She is not ill-appearing.   HENT:      Head: Normocephalic and atraumatic.   Eyes:      General:         Right eye: No discharge.         Left eye: No discharge.      Extraocular Movements: Extraocular movements intact.   Cardiovascular:      Rate and Rhythm: Normal rate.   Pulmonary:      Effort: Pulmonary effort is normal. No respiratory distress.      Breath sounds: No wheezing.   Abdominal:      General: Bowel sounds are normal.      Palpations: Abdomen is soft.      Tenderness: There is no abdominal tenderness.   Musculoskeletal:      Right lower leg: No edema.      Left lower leg: No edema.   Neurological:      General: No focal deficit present.      Mental Status: She is alert.      Motor: No weakness.      Coordination: Coordination normal.      Gait: Gait normal.      Deep Tendon Reflexes: Reflexes normal.   Psychiatric:         Mood and Affect: Mood normal.         Behavior: Behavior normal.

## 2025-07-16 ENCOUNTER — APPOINTMENT (OUTPATIENT)
Age: 30
End: 2025-07-16
Payer: COMMERCIAL

## 2025-07-16 ENCOUNTER — APPOINTMENT (OUTPATIENT)
Age: 30
End: 2025-07-16
Attending: PREVENTIVE MEDICINE
Payer: COMMERCIAL

## 2025-07-16 DIAGNOSIS — Z00.8 HEALTH EXAMINATION IN POPULATION SURVEYS: ICD-10-CM

## 2025-07-16 DIAGNOSIS — R53.83 OTHER FATIGUE: ICD-10-CM

## 2025-07-16 LAB
25(OH)D3 SERPL-MCNC: 40.8 NG/ML (ref 30–100)
ALBUMIN SERPL BCG-MCNC: 4.6 G/DL (ref 3.5–5)
ALP SERPL-CCNC: 52 U/L (ref 34–104)
ALT SERPL W P-5'-P-CCNC: 11 U/L (ref 7–52)
ANION GAP SERPL CALCULATED.3IONS-SCNC: 9 MMOL/L (ref 4–13)
AST SERPL W P-5'-P-CCNC: 15 U/L (ref 13–39)
BASOPHILS # BLD AUTO: 0.03 THOUSANDS/ÂΜL (ref 0–0.1)
BASOPHILS NFR BLD AUTO: 1 % (ref 0–1)
BILIRUB SERPL-MCNC: 0.84 MG/DL (ref 0.2–1)
BUN SERPL-MCNC: 16 MG/DL (ref 5–25)
CALCIUM SERPL-MCNC: 9.8 MG/DL (ref 8.4–10.2)
CHLORIDE SERPL-SCNC: 103 MMOL/L (ref 96–108)
CHOLEST SERPL-MCNC: 183 MG/DL (ref ?–200)
CO2 SERPL-SCNC: 27 MMOL/L (ref 21–32)
CREAT SERPL-MCNC: 0.79 MG/DL (ref 0.6–1.3)
CRP SERPL QL: <1 MG/L
EOSINOPHIL # BLD AUTO: 0.04 THOUSAND/ÂΜL (ref 0–0.61)
EOSINOPHIL NFR BLD AUTO: 1 % (ref 0–6)
ERYTHROCYTE [DISTWIDTH] IN BLOOD BY AUTOMATED COUNT: 11.9 % (ref 11.6–15.1)
EST. AVERAGE GLUCOSE BLD GHB EST-MCNC: 100 MG/DL
FERRITIN SERPL-MCNC: 90 NG/ML (ref 30–307)
GFR SERPL CREATININE-BSD FRML MDRD: 100 ML/MIN/1.73SQ M
GLUCOSE P FAST SERPL-MCNC: 97 MG/DL (ref 65–99)
HBA1C MFR BLD: 5.1 %
HCT VFR BLD AUTO: 40.6 % (ref 34.8–46.1)
HDLC SERPL-MCNC: 44 MG/DL
HGB BLD-MCNC: 13.4 G/DL (ref 11.5–15.4)
IMM GRANULOCYTES # BLD AUTO: 0.01 THOUSAND/UL (ref 0–0.2)
IMM GRANULOCYTES NFR BLD AUTO: 0 % (ref 0–2)
IRON SATN MFR SERPL: 22 % (ref 15–50)
IRON SERPL-MCNC: 64 UG/DL (ref 50–212)
LDLC SERPL CALC-MCNC: 120 MG/DL (ref 0–100)
LYMPHOCYTES # BLD AUTO: 1.65 THOUSANDS/ÂΜL (ref 0.6–4.47)
LYMPHOCYTES NFR BLD AUTO: 40 % (ref 14–44)
MCH RBC QN AUTO: 29.3 PG (ref 26.8–34.3)
MCHC RBC AUTO-ENTMCNC: 33 G/DL (ref 31.4–37.4)
MCV RBC AUTO: 89 FL (ref 82–98)
MONOCYTES # BLD AUTO: 0.41 THOUSAND/ÂΜL (ref 0.17–1.22)
MONOCYTES NFR BLD AUTO: 10 % (ref 4–12)
NEUTROPHILS # BLD AUTO: 1.97 THOUSANDS/ÂΜL (ref 1.85–7.62)
NEUTS SEG NFR BLD AUTO: 48 % (ref 43–75)
NONHDLC SERPL-MCNC: 139 MG/DL
NRBC BLD AUTO-RTO: 0 /100 WBCS
PLATELET # BLD AUTO: 212 THOUSANDS/UL (ref 149–390)
PMV BLD AUTO: 10.7 FL (ref 8.9–12.7)
POTASSIUM SERPL-SCNC: 4.3 MMOL/L (ref 3.5–5.3)
PROT SERPL-MCNC: 8.4 G/DL (ref 6.4–8.4)
RBC # BLD AUTO: 4.58 MILLION/UL (ref 3.81–5.12)
SODIUM SERPL-SCNC: 139 MMOL/L (ref 135–147)
TIBC SERPL-MCNC: 295.4 UG/DL (ref 250–450)
TRANSFERRIN SERPL-MCNC: 211 MG/DL (ref 203–362)
TRIGL SERPL-MCNC: 96 MG/DL (ref ?–150)
TSH SERPL DL<=0.05 MIU/L-ACNC: 2.57 UIU/ML (ref 0.45–4.5)
UIBC SERPL-MCNC: 231 UG/DL (ref 155–355)
VIT B12 SERPL-MCNC: 497 PG/ML (ref 180–914)
WBC # BLD AUTO: 4.11 THOUSAND/UL (ref 4.31–10.16)

## 2025-07-16 PROCEDURE — 83036 HEMOGLOBIN GLYCOSYLATED A1C: CPT

## 2025-07-16 PROCEDURE — 82607 VITAMIN B-12: CPT

## 2025-07-16 PROCEDURE — 82728 ASSAY OF FERRITIN: CPT

## 2025-07-16 PROCEDURE — 80053 COMPREHEN METABOLIC PANEL: CPT

## 2025-07-16 PROCEDURE — 83540 ASSAY OF IRON: CPT

## 2025-07-16 PROCEDURE — 84443 ASSAY THYROID STIM HORMONE: CPT

## 2025-07-16 PROCEDURE — 36415 COLL VENOUS BLD VENIPUNCTURE: CPT

## 2025-07-16 PROCEDURE — 85025 COMPLETE CBC W/AUTO DIFF WBC: CPT

## 2025-07-16 PROCEDURE — 86140 C-REACTIVE PROTEIN: CPT

## 2025-07-16 PROCEDURE — 82306 VITAMIN D 25 HYDROXY: CPT

## 2025-07-16 PROCEDURE — 83550 IRON BINDING TEST: CPT

## 2025-07-16 PROCEDURE — 80061 LIPID PANEL: CPT

## 2025-07-30 ENCOUNTER — TELEPHONE (OUTPATIENT)
Dept: OBGYN CLINIC | Facility: CLINIC | Age: 30
End: 2025-07-30